# Patient Record
Sex: FEMALE | Race: BLACK OR AFRICAN AMERICAN | Employment: FULL TIME | URBAN - METROPOLITAN AREA
[De-identification: names, ages, dates, MRNs, and addresses within clinical notes are randomized per-mention and may not be internally consistent; named-entity substitution may affect disease eponyms.]

---

## 2021-03-29 PROBLEM — M79.604 RIGHT LEG PAIN: Status: ACTIVE | Noted: 2021-03-29

## 2021-03-29 PROBLEM — Z76.89 ENCOUNTER TO ESTABLISH CARE WITH NEW DOCTOR: Status: ACTIVE | Noted: 2021-03-29

## 2021-03-29 PROBLEM — F17.210 CIGARETTE NICOTINE DEPENDENCE WITHOUT COMPLICATION: Status: ACTIVE | Noted: 2021-03-29

## 2021-12-08 ENCOUNTER — TELEPHONE (OUTPATIENT)
Dept: PODIATRY | Age: 56
End: 2021-12-08

## 2021-12-08 NOTE — TELEPHONE ENCOUNTER
Writer called pt to inform her that her podiatry appointment on 12/10 has been cancelled due to Dr. Colonel Alvarado no longer seeing patients at [facility]. Pt unavailable, writer left vm with phone number for patient to call back and detailed reasoning for cancellation.

## 2022-10-02 ENCOUNTER — HOSPITAL ENCOUNTER (EMERGENCY)
Age: 57
Discharge: HOME | End: 2022-10-02
Attending: EMERGENCY MEDICINE
Payer: COMMERCIAL

## 2022-10-02 VITALS
TEMPERATURE: 97.7 F | SYSTOLIC BLOOD PRESSURE: 154 MMHG | HEART RATE: 86 BPM | RESPIRATION RATE: 16 BRPM | DIASTOLIC BLOOD PRESSURE: 83 MMHG | OXYGEN SATURATION: 97 %

## 2022-10-02 DIAGNOSIS — L03.019 PARONYCHIA OF FINGER, UNSPECIFIED LATERALITY: Primary | ICD-10-CM

## 2022-10-02 PROCEDURE — 6370000000 HC RX 637 (ALT 250 FOR IP): Performed by: STUDENT IN AN ORGANIZED HEALTH CARE EDUCATION/TRAINING PROGRAM

## 2022-10-02 PROCEDURE — 99283 EMERGENCY DEPT VISIT LOW MDM: CPT

## 2022-10-02 RX ORDER — CEPHALEXIN 500 MG/1
500 CAPSULE ORAL 3 TIMES DAILY
Qty: 21 CAPSULE | Refills: 0 | Status: SHIPPED | OUTPATIENT
Start: 2022-10-02 | End: 2022-10-09

## 2022-10-02 RX ORDER — IBUPROFEN 800 MG/1
800 TABLET ORAL 2 TIMES DAILY PRN
Qty: 60 TABLET | Refills: 0 | Status: SHIPPED | OUTPATIENT
Start: 2022-10-02

## 2022-10-02 RX ORDER — IBUPROFEN 800 MG/1
800 TABLET ORAL ONCE
Status: COMPLETED | OUTPATIENT
Start: 2022-10-02 | End: 2022-10-02

## 2022-10-02 RX ORDER — CEPHALEXIN 250 MG/1
500 CAPSULE ORAL ONCE
Status: COMPLETED | OUTPATIENT
Start: 2022-10-02 | End: 2022-10-02

## 2022-10-02 RX ADMIN — IBUPROFEN 800 MG: 800 TABLET, FILM COATED ORAL at 23:25

## 2022-10-02 RX ADMIN — CEPHALEXIN 500 MG: 250 CAPSULE ORAL at 23:25

## 2022-10-02 ASSESSMENT — PAIN SCALES - GENERAL: PAINLEVEL_OUTOF10: 5

## 2022-10-02 ASSESSMENT — PAIN - FUNCTIONAL ASSESSMENT: PAIN_FUNCTIONAL_ASSESSMENT: 0-10

## 2022-10-02 NOTE — Clinical Note
Marisa Boykin was seen and treated in our emergency department on 10/2/2022. She may return to work on 10/05/2022. If you have any questions or concerns, please don't hesitate to call.       Litzy Nance, DO

## 2022-10-03 ASSESSMENT — ENCOUNTER SYMPTOMS
CHEST TIGHTNESS: 0
ABDOMINAL PAIN: 0
BACK PAIN: 0
TROUBLE SWALLOWING: 0
SHORTNESS OF BREATH: 0
COUGH: 0
VOMITING: 0
COLOR CHANGE: 0
DIARRHEA: 0
NAUSEA: 0

## 2022-10-03 NOTE — ED PROVIDER NOTES
[facility]  ED  Emergency Department Encounter  EmergencyMedicine Resident     Pt Shaun Burks  MRN: [id number]  Sumagf[place]phon 1965  Date of evaluation: 10/2/22  PCP:  No primary care provider on file. This patient was evaluated in the Emergency Department for symptoms described in the history of present illness. The patient was evaluated in the context of the global COVID-19 pandemic, which necessitated consideration that the patient might be at risk for infection with the SARS-CoV-2 virus that causes COVID-19. Institutional protocols and algorithms that pertain to the evaluation of patients at risk for COVID-19 are in a state of rapid change based on information released by regulatory bodies including the CDC and federal and state organizations. These policies and algorithms were followed during the patient's care in the ED. CHIEF COMPLAINT       Chief Complaint   Patient presents with    Other     Right ring finger pain. HISTORY OF PRESENT ILLNESS  (Location/Symptom, Timing/Onset, Context/Setting, Quality, Duration, Modifying Factors, Severity.)      Raeann Ascencio is a 59 y.o. female who presents with right ring finger pain and swelling, started few days ago. Patient is not have any fevers chills nausea vomiting she is having some tenderness to the outside of the ring finger. PAST MEDICAL / SURGICAL / SOCIAL / FAMILY HISTORY      has a past medical history of Optic nerve swelling.       has no past surgical history on file.       Social History     Socioeconomic History    Marital status: Single     Spouse name: Not on file    Number of children: Not on file    Years of education: Not on file    Highest education level: Not on file   Occupational History    Not on file   Tobacco Use    Smoking status: Every Day    Smokeless tobacco: Never   Substance and Sexual Activity    Alcohol use: Not on file    Drug use: Not on file    Sexual activity: Not on file   Other Topics Concern    Not on file   Social History Narrative    Not on file     Social Determinants of Health     Financial Resource Strain: Not on file   Food Insecurity: Not on file   Transportation Needs: Not on file   Physical Activity: Not on file   Stress: Not on file   Social Connections: Not on file   Intimate Partner Violence: Not on file   Housing Stability: Not on file       Family History   Problem Relation Age of Onset    No Known Problems Mother     Heart Failure Father        Allergies:  Patient has no known allergies. Home Medications:  Prior to Admission medications    Medication Sig Start Date End Date Taking? Authorizing Provider   cephALEXin (KEFLEX) 500 MG capsule Take 1 capsule by mouth 3 times daily for 7 days 10/2/22 10/9/22 Yes Víctor Macias, DO   diclofenac sodium (VOLTAREN) 1 % GEL Apply 4 g topically 4 times daily for 10 days 10/2/22 10/12/22 Yes Víctor Macias, DO   ibuprofen (ADVIL;MOTRIN) 800 MG tablet Take 1 tablet by mouth 2 times daily as needed for Pain 10/2/22  Yes Víctor Macias, DO   buPROPion (WELLBUTRIN XL) 150 MG extended release tablet Take 1 tablet by mouth every morning  Patient not taking: Reported on 10/2/2022 3/29/21   Tyrone Antony MD       REVIEW OF SYSTEMS    (2-9 systems for level 4, 10 or more for level 5)      Review of Systems   Constitutional:  Negative for chills, fatigue and fever. HENT:  Negative for congestion and trouble swallowing. Eyes:  Negative for visual disturbance. Respiratory:  Negative for cough, chest tightness and shortness of breath. Gastrointestinal:  Negative for abdominal pain, diarrhea, nausea and vomiting. Endocrine: Negative for polyuria. Genitourinary:  Negative for dysuria, flank pain, hematuria and urgency. Musculoskeletal:  Positive for joint swelling. Negative for back pain and myalgias. Skin:  Negative for color change. Allergic/Immunologic: Negative for immunocompromised state.    Neurological:  Negative for dizziness, syncope, weakness, light-headedness and headaches. PHYSICAL EXAM   (up to 7 for level 4, 8 or more for level 5)      INITIAL VITALS:   BP (!) 154/83   Pulse 86   Temp 97.7 °F (36.5 °C)   Resp 16   SpO2 97%     Physical Exam  Constitutional:       General: She is not in acute distress. Appearance: Normal appearance. She is not ill-appearing or toxic-appearing. HENT:      Head: Normocephalic and atraumatic. Nose: No congestion. Mouth/Throat:      Mouth: Mucous membranes are moist.   Eyes:      Conjunctiva/sclera: Conjunctivae normal.   Cardiovascular:      Rate and Rhythm: Normal rate and regular rhythm. Pulses: Normal pulses. Heart sounds: Normal heart sounds. No murmur heard. No gallop. Pulmonary:      Effort: Pulmonary effort is normal. No respiratory distress. Breath sounds: Normal breath sounds. No stridor. No wheezing or rhonchi. Chest:      Chest wall: No tenderness. Abdominal:      General: Abdomen is flat. There is no distension. Palpations: There is no mass. Tenderness: There is no abdominal tenderness. There is no guarding or rebound. Musculoskeletal:         General: Swelling present. No tenderness or deformity. Comments: Mild swelling to right ring finger distal tip, patient does not have any pain with extension, no tenderness on the flexor sheath, Kanavel sign negative. Skin:     General: Skin is warm. Coloration: Skin is not jaundiced. Findings: No bruising. Neurological:      General: No focal deficit present. Mental Status: She is alert. DIFFERENTIAL  DIAGNOSIS     PLAN (LABS / IMAGING / EKG):  No orders of the defined types were placed in this encounter.       MEDICATIONS ORDERED:  Orders Placed This Encounter   Medications    cephALEXin (KEFLEX) 500 MG capsule     Sig: Take 1 capsule by mouth 3 times daily for 7 days     Dispense:  21 capsule     Refill:  0    diclofenac sodium (VOLTAREN) 1 % GEL Sig: Apply 4 g topically 4 times daily for 10 days     Dispense:  160 g     Refill:  0    ibuprofen (ADVIL;MOTRIN) 800 MG tablet     Sig: Take 1 tablet by mouth 2 times daily as needed for Pain     Dispense:  60 tablet     Refill:  0    cephALEXin (KEFLEX) capsule 500 mg     Order Specific Question:   Antimicrobial Indications     Answer:   Skin and Soft Tissue Infection    ibuprofen (ADVIL;MOTRIN) tablet 800 mg    DISCONTD: diclofenac sodium (VOLTAREN) 1 % gel 2 g         DIAGNOSTIC RESULTS / EMERGENCY DEPARTMENT COURSE / MDM   LAB RESULTS:  No results found for this visit on 10/02/22. Assessment/Plan: 20-year-old female with paronychia of the right ring finger, will discharge with oral antibiotics, Motrin, Voltaren gel, advised to follow-up with primary care within 1 week sooner if concerns. ER return precautions given the patient appropriate for discharge and outpatient follow-up. FINAL IMPRESSION      1.  Paronychia of finger, unspecified laterality          DISPOSITION / PLAN     DISPOSITION Decision To Discharge 10/02/2022 10:57:09 PM      PATIENT REFERRED TO:  [facility] ED  [address]  Go to   As needed    Gerri Taylor MD  [address] [place] [address]  [address]  [phone]          DISCHARGE MEDICATIONS:  Discharge Medication List as of 10/2/2022 11:14 PM        START taking these medications    Details   cephALEXin (KEFLEX) 500 MG capsule Take 1 capsule by mouth 3 times daily for 7 days, Disp-21 capsule, R-0Print      diclofenac sodium (VOLTAREN) 1 % GEL Apply 4 g topically 4 times daily for 10 days, Topical, 4 TIMES DAILY Starting Sun 10/2/2022, Until Wed 10/12/2022, For 10 days, Disp-160 g, R-0, Print      ibuprofen (ADVIL;MOTRIN) 800 MG tablet Take 1 tablet by mouth 2 times daily as needed for Pain, Disp-60 tablet, R-0Print             Freeman Caldwell DO  Emergency Medicine Resident    (Please note that portions of thisnote were completed with a voice recognition program.  Efforts were made to edit the dictations but occasionally words are mis-transcribed.)        Sheela Rider, DO  Resident  10/03/22 9505

## 2022-10-03 NOTE — ED PROVIDER NOTES
[facility]  Emergency Department  Faculty Attestation     I performed a history and physical examination of the patient and discussed management with the resident. I reviewed the residents note and agree with the documented findings and plan of care. Any areas of disagreement are noted on the chart. I was personally present for the key portions of any procedures. I have documented in the chart those procedures where I was not present during the key portions. I have reviewed the emergency nurses triage note. I agree with the chief complaint, past medical history, past surgical history, allergies, medications, social and family history as documented unless otherwise noted below. For Physician Assistant/ Nurse Practitioner cases/documentation I have personally evaluated this patient and have completed at least one if not all key elements of the E/M (history, physical exam, and MDM). Additional findings are as noted. Primary Care Physician:  Selene Nation MD    [facility]       Chief Complaint   Patient presents with    Other     Right ring finger pain. RECENT VITALS:   Temp: 97.7 °F (36.5 °C),  Heart Rate: 86, Resp: 16, BP: (!) 154/83    LABS:  Labs Reviewed - No data to display    Radiology  No orders to display       Attending Physician Additional  Notes  Patient has pain and swelling over the right ring finger eponychial fold laterally. There is no swelling or pain on the pad. No trauma. No drainage. No limitation in tendon movement. No change in sensation. No history of paronychia. She believes she will be unable to work tomorrow with this due to pain. On exam she has very early paronychia on the right ring finger, no immediate need for incision and drainage. No involvement of the pad to suggest a felon.   Plan is oral antibiotics, warm compresses, return if worsening or new symptoms, work note, will start on oral antibiotics. Hui Bacca.  Sharona Bee MD, [address] [facility]  Attending Emergency  Physician                Gloria Manjarrez MD  10/02/22 0034

## 2022-10-03 NOTE — DISCHARGE INSTRUCTIONS
You have been seen in the ER today for finger pain. You likely have paronychia, please see attached information regarding this condition. Please take medications that were prescribed to you as directed. If you begin to experience any symptoms such as chest pain shortness of breath nausea vomiting dizziness drowsiness abdominal pain loss of consciousness or any other symptoms you find concerning please return to the ED for follow-up evaluation. If you have been given pain medication please take them only as prescribed for the your symptoms. Do not take more medication than recommended at any given time. Please follow-up with your primary care provider within 5 to 7 days for continued care, sooner if you have concerns.

## 2022-10-03 NOTE — ED NOTES
Pt presents to the ER with c/o right ring finger pain. Pt states it started a couple days ago, but cannot go to work tomorrow with this pain. Pt able to move finger freely. Pt denies any injuries to finger. Pt A&O x4, VSS, in NAD otherwise. Call light placed within reach, no other complaints at this time.      Braxton Naidu, AL  10/02/22 [id number]

## 2023-01-05 ENCOUNTER — TRANSCRIBE ORDERS (OUTPATIENT)
Dept: ADMINISTRATIVE | Age: 58
End: 2023-01-05

## 2023-01-05 DIAGNOSIS — M79.662 PAIN OF LEFT LOWER LEG: Primary | ICD-10-CM

## 2023-01-06 ENCOUNTER — HOSPITAL ENCOUNTER (OUTPATIENT)
Dept: VASCULAR LAB | Age: 58
Discharge: HOME | End: 2023-01-06
Payer: COMMERCIAL

## 2023-01-06 DIAGNOSIS — M79.662 PAIN OF LEFT LOWER LEG: ICD-10-CM

## 2023-01-06 PROCEDURE — 93971 EXTREMITY STUDY: CPT

## 2023-05-31 ENCOUNTER — OFFICE VISIT (OUTPATIENT)
Dept: ORTHOPEDIC SURGERY | Age: 58
End: 2023-05-31
Payer: COMMERCIAL

## 2023-05-31 DIAGNOSIS — M25.562 LEFT KNEE PAIN, UNSPECIFIED CHRONICITY: Primary | ICD-10-CM

## 2023-05-31 PROCEDURE — 99203 OFFICE O/P NEW LOW 30 MIN: CPT | Performed by: ORTHOPAEDIC SURGERY

## 2023-05-31 PROCEDURE — G8421 BMI NOT CALCULATED: HCPCS | Performed by: ORTHOPAEDIC SURGERY

## 2023-05-31 PROCEDURE — G8428 CUR MEDS NOT DOCUMENT: HCPCS | Performed by: ORTHOPAEDIC SURGERY

## 2023-05-31 PROCEDURE — 3017F COLORECTAL CA SCREEN DOC REV: CPT | Performed by: ORTHOPAEDIC SURGERY

## 2023-05-31 PROCEDURE — 4004F PT TOBACCO SCREEN RCVD TLK: CPT | Performed by: ORTHOPAEDIC SURGERY

## 2023-05-31 NOTE — PROGRESS NOTES
file    Highest education level: Not on file   Occupational History    Not on file   Tobacco Use    Smoking status: Every Day    Smokeless tobacco: Never   Substance and Sexual Activity    Alcohol use: Not on file    Drug use: Not on file    Sexual activity: Not on file   Other Topics Concern    Not on file   Social History Narrative    Not on file     Social Determinants of Health     Financial Resource Strain: Not on file   Food Insecurity: Not on file   Transportation Needs: Not on file   Physical Activity: Not on file   Stress: Not on file   Social Connections: Not on file   Intimate Partner Violence: Not on file   Housing Stability: Not on file     Past Medical History:   Diagnosis Date    Optic nerve swelling      No past surgical history on file. Family History   Problem Relation Age of Onset    No Known Problems Mother     Heart Failure Father    Plan  Patient will be scheduled for an MRI of her left knee. We did discuss that if this is indeed positive then would she would benefit from arthroscopic intervention we discussed the details of that procedure as well as the risk and benefits. We will await the results of the MRI      Provider Attestation:  Justice Pemberton, personally performed the services described in this documentation. All medical record entries made by the scribe were at my direction and in my presence. I have reviewed the chart and discharge instructions and agree that the records reflect my personal performance and is accurate and complete. Alex Leahy MD. 05/31/23      Please note that this chart was generated using voice recognition Dragon dictation software. Although every effort was made to ensure the accuracy of this automated transcription, some errors in transcription may have occurred.

## 2023-06-29 ENCOUNTER — HOSPITAL ENCOUNTER (OUTPATIENT)
Dept: MRI IMAGING | Facility: CLINIC | Age: 58
Discharge: HOME | End: 2023-07-01
Payer: COMMERCIAL

## 2023-06-29 DIAGNOSIS — M25.562 LEFT KNEE PAIN, UNSPECIFIED CHRONICITY: ICD-10-CM

## 2023-06-29 PROCEDURE — 73721 MRI JNT OF LWR EXTRE W/O DYE: CPT

## 2023-11-28 ENCOUNTER — HOSPITAL ENCOUNTER (EMERGENCY)
Age: 58
Discharge: HOME | End: 2023-11-28
Attending: EMERGENCY MEDICINE
Payer: COMMERCIAL

## 2023-11-28 VITALS
HEART RATE: 76 BPM | SYSTOLIC BLOOD PRESSURE: 179 MMHG | TEMPERATURE: 97.2 F | OXYGEN SATURATION: 97 % | RESPIRATION RATE: 18 BRPM | DIASTOLIC BLOOD PRESSURE: 78 MMHG

## 2023-11-28 DIAGNOSIS — M79.675 PAIN OF TOE OF LEFT FOOT: Primary | ICD-10-CM

## 2023-11-28 DIAGNOSIS — L84 CORN OF TOE: ICD-10-CM

## 2023-11-28 PROCEDURE — 99283 EMERGENCY DEPT VISIT LOW MDM: CPT

## 2023-11-28 ASSESSMENT — PAIN - FUNCTIONAL ASSESSMENT: PAIN_FUNCTIONAL_ASSESSMENT: 0-10

## 2023-11-28 ASSESSMENT — PAIN SCALES - GENERAL: PAINLEVEL_OUTOF10: 7

## 2023-11-28 ASSESSMENT — ENCOUNTER SYMPTOMS
COLOR CHANGE: 1
BACK PAIN: 0
GASTROINTESTINAL NEGATIVE: 1
RESPIRATORY NEGATIVE: 1

## 2025-03-08 ENCOUNTER — HOSPITAL ENCOUNTER (EMERGENCY)
Age: 60
Discharge: HOME | End: 2025-03-08
Attending: EMERGENCY MEDICINE
Payer: COMMERCIAL

## 2025-03-08 ENCOUNTER — APPOINTMENT (OUTPATIENT)
Dept: GENERAL RADIOLOGY | Age: 60
End: 2025-03-08
Payer: COMMERCIAL

## 2025-03-08 VITALS
OXYGEN SATURATION: 100 % | HEIGHT: 66 IN | BODY MASS INDEX: 30.53 KG/M2 | HEART RATE: 73 BPM | WEIGHT: 190 LBS | TEMPERATURE: 97.7 F | RESPIRATION RATE: 16 BRPM | SYSTOLIC BLOOD PRESSURE: 118 MMHG | DIASTOLIC BLOOD PRESSURE: 62 MMHG

## 2025-03-08 DIAGNOSIS — S70.01XA CONTUSION OF RIGHT HIP, INITIAL ENCOUNTER: Primary | ICD-10-CM

## 2025-03-08 PROCEDURE — 99284 EMERGENCY DEPT VISIT MOD MDM: CPT

## 2025-03-08 PROCEDURE — 6360000002 HC RX W HCPCS: Performed by: EMERGENCY MEDICINE

## 2025-03-08 PROCEDURE — 96372 THER/PROPH/DIAG INJ SC/IM: CPT

## 2025-03-08 PROCEDURE — 73502 X-RAY EXAM HIP UNI 2-3 VIEWS: CPT

## 2025-03-08 RX ORDER — CYCLOBENZAPRINE HCL 10 MG
10 TABLET ORAL 2 TIMES DAILY PRN
Qty: 20 TABLET | Refills: 0 | Status: SHIPPED | OUTPATIENT
Start: 2025-03-08 | End: 2025-03-18

## 2025-03-08 RX ORDER — IBUPROFEN 800 MG/1
800 TABLET, FILM COATED ORAL 2 TIMES DAILY PRN
Qty: 30 TABLET | Refills: 1 | Status: SHIPPED | OUTPATIENT
Start: 2025-03-08

## 2025-03-08 RX ORDER — KETOROLAC TROMETHAMINE 30 MG/ML
60 INJECTION, SOLUTION INTRAMUSCULAR; INTRAVENOUS ONCE
Status: COMPLETED | OUTPATIENT
Start: 2025-03-08 | End: 2025-03-08

## 2025-03-08 RX ADMIN — KETOROLAC TROMETHAMINE 60 MG: 60 INJECTION, SOLUTION INTRAMUSCULAR at 14:31

## 2025-03-08 ASSESSMENT — PAIN SCALES - GENERAL
PAINLEVEL_OUTOF10: 2
PAINLEVEL_OUTOF10: 6

## 2025-03-08 ASSESSMENT — ENCOUNTER SYMPTOMS
EYE DISCHARGE: 0
EYE PAIN: 0
SHORTNESS OF BREATH: 0
ABDOMINAL PAIN: 0
FACIAL SWELLING: 0
ABDOMINAL DISTENTION: 0
CHEST TIGHTNESS: 0
BACK PAIN: 0

## 2025-03-08 ASSESSMENT — PAIN - FUNCTIONAL ASSESSMENT: PAIN_FUNCTIONAL_ASSESSMENT: 0-10

## 2025-03-08 NOTE — ED PROVIDER NOTES
EMERGENCY DEPARTMENT ENCOUNTER    Pt Name: Ayleen Shelton  MRN: [id number]  Birthdate 1965  Date of evaluation: 3/8/25  CHIEF COMPLAINT       Chief Complaint   Patient presents with    Hip Pain     Not flank pain. R hip pain. She reports she was seen at urgent care and tested for uti. Was treated with baclofen. She reports that she also slipped down the stairs. Movement aggravates it.      HISTORY OF PRESENT ILLNESS   HPI   The patient is a 59-year-old female who presented to the emergency department secondary to right hip pain.  Patient complains of pain in her right hip and back.  She was seen in urgent care diagnosed with urinary tract infection and given Bactrim some symptoms improved she continued pain in her right hip.  Patient stated about 2 weeks ago she fell down the steps going into the basement, and her right hip she did not think about that initially but since then she has had intermittent pain.  Maikel progressively worse.  She complains of pain localized to her right hip 6 out of 10 on the pain scale.  Patient denies previous history of sciatica.  Denied associated back pain.  Denied calf pain.  She denied loss of bladder or bowel urinary retention.  Patient had chest pain, shortness of breath, nausea, vomiting, fevers or chills      REVIEW OF SYSTEMS     Review of Systems   Constitutional:  Negative for chills, diaphoresis and fever.   HENT:  Negative for congestion, ear pain and facial swelling.    Eyes:  Negative for pain, discharge and visual disturbance.   Respiratory:  Negative for chest tightness and shortness of breath.    Cardiovascular:  Negative for chest pain and palpitations.   Gastrointestinal:  Negative for abdominal distention and abdominal pain.   Genitourinary:  Negative for difficulty urinating and flank pain.   Musculoskeletal:  Negative for back pain.        Right hip pain   Skin:  Negative for wound.   Neurological:  Negative for dizziness, light-headedness and headaches.  by editing.        Ivonne Rice MD  03/08/25 [id number]

## 2025-06-09 ENCOUNTER — HOSPITAL ENCOUNTER (OUTPATIENT)
Dept: GENERAL RADIOLOGY | Age: 60
Discharge: HOME | End: 2025-06-11
Payer: COMMERCIAL

## 2025-06-09 ENCOUNTER — OFFICE VISIT (OUTPATIENT)
Age: 60
End: 2025-06-09
Payer: COMMERCIAL

## 2025-06-09 VITALS — BODY MASS INDEX: 30.53 KG/M2 | WEIGHT: 190 LBS | HEIGHT: 66 IN

## 2025-06-09 DIAGNOSIS — M79.5 FOREIGN BODY (FB) IN SOFT TISSUE: ICD-10-CM

## 2025-06-09 DIAGNOSIS — M79.672 LEFT FOOT PAIN: ICD-10-CM

## 2025-06-09 DIAGNOSIS — L85.9: ICD-10-CM

## 2025-06-09 DIAGNOSIS — M79.5 FOREIGN BODY (FB) IN SOFT TISSUE: Primary | ICD-10-CM

## 2025-06-09 PROCEDURE — 99203 OFFICE O/P NEW LOW 30 MIN: CPT | Performed by: PODIATRIST

## 2025-06-09 PROCEDURE — 11055 PARING/CUTG B9 HYPRKER LES 1: CPT | Performed by: PODIATRIST

## 2025-06-09 PROCEDURE — 73630 X-RAY EXAM OF FOOT: CPT

## 2025-06-09 RX ORDER — UREA 40 %
CREAM (GRAM) TOPICAL NIGHTLY
Qty: 28 G | Refills: 2 | Status: SHIPPED | OUTPATIENT
Start: 2025-06-09

## 2025-06-10 PROBLEM — M79.672 LEFT FOOT PAIN: Status: ACTIVE | Noted: 2025-06-10

## 2025-06-10 PROBLEM — M79.5 FOREIGN BODY (FB) IN SOFT TISSUE: Status: ACTIVE | Noted: 2025-06-10

## 2025-06-10 PROBLEM — L85.9: Status: ACTIVE | Noted: 2025-06-10

## 2025-06-10 NOTE — PROGRESS NOTES
[facility]  [address]   [address] [place] [address]  Tel: [phone]   Fax: [phone]    Subjective     CC: possible foreign body    HPI:  Ayleen Shelton is a 59 y.o. year old female who presents to clinic today for possible foreign body to the left plantar foot.  Patient states she recalls stepping on glass a few weeks ago and was able to remove a piece herself at home.  Ever since then patient has had pain to that area with formation of callus.  Patient is unable to ambulate without pain.  She has no other foot or ankle complaints at this time.  She has not had any recent lab work and did not obtain her most recent hemoglobin A1c value.  Patient denies any constitutional symptoms.  No purulence or drainage from the area.    Primary care physician is Maggie Albert MD.    ROS:    Constitutional: Denies nausea, vomiting, fever, chills.  Neurologic: Denies numbness, tingling, and burning in the feet.    Vascular: Denies symptoms of lower extremity claudication.    Skin: Denies open wounds.  Otherwise negative except as noted in the HPI.     PMH:  Past Medical History:   Diagnosis Date    Optic nerve swelling        Surgical History:   No past surgical history on file.    Social History:  Social History     Tobacco Use    Smoking status: Every Day    Smokeless tobacco: Never       Medications:  Prior to Admission medications    Medication Sig Start Date End Date Taking? Authorizing Provider   Urea (CARMOL) 40 % cream Apply topically nightly 6/9/25  Yes Yumiko Zayas DPM   ibuprofen (ADVIL;MOTRIN) 800 MG tablet Take 1 tablet by mouth 2 times daily as needed for Pain 3/8/25   Ivonne Rice MD   diclofenac sodium (VOLTAREN) 1 % GEL Apply 4 g topically 4 times daily for 10 days 10/2/22 10/12/22  Ephraim Macias, DO   ibuprofen (ADVIL;MOTRIN) 800 MG tablet Take 1 tablet by mouth 2 times daily as needed for Pain 10/2/22   Ephraim Macias DO   buPROPion (WELLBUTRIN XL) 150 MG  Preliminary review does not show any concern.  Urea cream prescribed for patient to apply to callused areas daily.  Patient to RTC in 2 week(s).   Please, call the office with any questions or concerns     Orders Placed This Encounter   Medications    Urea (CARMOL) 40 % cream     Sig: Apply topically nightly     Dispense:  28 g     Refill:  2     Orders Placed This Encounter   Procedures    XR FOOT LEFT (MIN 3 VIEWS)     Standing Status:   Future     Number of Occurrences:   1     Expected Date:   6/9/2025     Expiration Date:   6/9/2026       Yumiko Zayas DPM PGY1  Podiatric Medicine & Surgery   6/10/2025 at 9:27 AM

## 2025-06-12 NOTE — PROGRESS NOTES
Patient instructed to remove shoes and socks and instructed to sit in exam chair.  Current PCP is Maggie Albert MD and date of last visit was .   Do you have a follow up visit scheduled?  No  If yes, the date is

## 2025-06-23 ENCOUNTER — OFFICE VISIT (OUTPATIENT)
Age: 60
End: 2025-06-23

## 2025-06-23 VITALS
HEART RATE: 67 BPM | DIASTOLIC BLOOD PRESSURE: 67 MMHG | SYSTOLIC BLOOD PRESSURE: 139 MMHG | BODY MASS INDEX: 30.53 KG/M2 | HEIGHT: 66 IN | WEIGHT: 190 LBS

## 2025-06-23 DIAGNOSIS — L84 CORNS AND CALLOSITIES: ICD-10-CM

## 2025-06-23 DIAGNOSIS — M79.672 LEFT FOOT PAIN: ICD-10-CM

## 2025-06-23 DIAGNOSIS — L84 HELOMA MOLLE: ICD-10-CM

## 2025-06-23 DIAGNOSIS — F17.200 CURRENT SMOKER: ICD-10-CM

## 2025-06-23 NOTE — PROGRESS NOTES
[facility]  [address].   [address] [place] [address]  Tel: [phone]   Fax: [phone]    Subjective     CC: Left foot pain    Interval History:  Patient returns to clinic for follow up for possible foreign body in plantar lateral aspect of the left foot. Patient rates 5/10 pain today when walking. Debridement from last visit aided the complaint minimally. Patient did not obtain prescribed urea cream from pharmacy due to no insurance coverage. New complaint of callus in the 3rd interspace on the 4th digit that is causing discomfort. Patient denies any constitutional symptoms. No purulence or drainage from the area.    HPI:  Ayleen Shelton is a 59 y.o. year old female who presents to clinic today for possible foreign body to the left plantar foot.  Patient states she recalls stepping on glass a few weeks ago and was able to remove a piece herself at home.  Ever since then patient has had pain to that area with formation of callus.  Patient is unable to ambulate without pain.  She has no other foot or ankle complaints at this time.  She has not had any recent lab work and did not obtain her most recent hemoglobin A1c value.  Patient denies any constitutional symptoms.  No purulence or drainage from the area.    Primary care physician is Maggie Albert MD.    ROS:    Constitutional: Denies nausea, vomiting, fever, chills.  Neurologic: Denies numbness, tingling, and burning in the feet.    Vascular: Denies symptoms of lower extremity claudication.    Skin: Denies open wounds.  Otherwise negative except as noted in the HPI.     PMH:  Past Medical History:   Diagnosis Date    Optic nerve swelling        Surgical History:   No past surgical history on file.    Social History:  Social History     Tobacco Use    Smoking status: Every Day    Smokeless tobacco: Never       Medications:  Prior to Admission medications    Medication Sig Start Date End Date Taking? Authorizing Provider   Urea